# Patient Record
Sex: MALE | Race: WHITE | Employment: STUDENT | URBAN - METROPOLITAN AREA
[De-identification: names, ages, dates, MRNs, and addresses within clinical notes are randomized per-mention and may not be internally consistent; named-entity substitution may affect disease eponyms.]

---

## 2024-08-24 LAB — SICKLE CELL SCREEN: NEGATIVE

## 2024-11-06 ENCOUNTER — OFFICE VISIT (OUTPATIENT)
Dept: PRIMARY CARE CLINIC | Age: 19
End: 2024-11-06

## 2024-11-06 VITALS
RESPIRATION RATE: 16 BRPM | WEIGHT: 220 LBS | BODY MASS INDEX: 25.98 KG/M2 | OXYGEN SATURATION: 98 % | DIASTOLIC BLOOD PRESSURE: 66 MMHG | HEART RATE: 59 BPM | TEMPERATURE: 97.9 F | SYSTOLIC BLOOD PRESSURE: 100 MMHG | HEIGHT: 77 IN

## 2024-11-06 DIAGNOSIS — L02.416 ABSCESS OF LEFT LOWER LEG: Primary | ICD-10-CM

## 2024-11-06 RX ORDER — SULFAMETHOXAZOLE AND TRIMETHOPRIM 800; 160 MG/1; MG/1
1 TABLET ORAL 2 TIMES DAILY
Qty: 14 TABLET | Refills: 0 | Status: SHIPPED | OUTPATIENT
Start: 2024-11-06 | End: 2024-11-13

## 2024-11-06 SDOH — ECONOMIC STABILITY: FOOD INSECURITY: WITHIN THE PAST 12 MONTHS, YOU WORRIED THAT YOUR FOOD WOULD RUN OUT BEFORE YOU GOT MONEY TO BUY MORE.: NEVER TRUE

## 2024-11-06 SDOH — ECONOMIC STABILITY: FOOD INSECURITY: WITHIN THE PAST 12 MONTHS, THE FOOD YOU BOUGHT JUST DIDN'T LAST AND YOU DIDN'T HAVE MONEY TO GET MORE.: NEVER TRUE

## 2024-11-06 SDOH — ECONOMIC STABILITY: INCOME INSECURITY: HOW HARD IS IT FOR YOU TO PAY FOR THE VERY BASICS LIKE FOOD, HOUSING, MEDICAL CARE, AND HEATING?: NOT HARD AT ALL

## 2025-03-21 ENCOUNTER — OFFICE VISIT (OUTPATIENT)
Dept: PRIMARY CARE CLINIC | Age: 20
End: 2025-03-21

## 2025-03-21 VITALS
BODY MASS INDEX: 26.05 KG/M2 | OXYGEN SATURATION: 97 % | RESPIRATION RATE: 16 BRPM | TEMPERATURE: 98.1 F | HEIGHT: 77 IN | HEART RATE: 74 BPM | WEIGHT: 220.6 LBS | SYSTOLIC BLOOD PRESSURE: 113 MMHG | DIASTOLIC BLOOD PRESSURE: 65 MMHG

## 2025-03-21 DIAGNOSIS — T78.40XA ALLERGIC REACTION TO DRUG, INITIAL ENCOUNTER: Primary | ICD-10-CM

## 2025-03-21 DIAGNOSIS — Z23 NEED FOR TDAP VACCINATION: ICD-10-CM

## 2025-03-21 RX ORDER — CEPHALEXIN 500 MG/1
500 CAPSULE ORAL EVERY 6 HOURS
Qty: 20 CAPSULE | Refills: 0 | Status: SHIPPED | OUTPATIENT
Start: 2025-03-21 | End: 2025-03-26

## 2025-03-21 ASSESSMENT — ENCOUNTER SYMPTOMS
ABDOMINAL PAIN: 0
APNEA: 0
SORE THROAT: 0
VOMITING: 0

## 2025-03-21 NOTE — PROGRESS NOTES
Chief Complaint:   Allergic Reaction (Possible allergic reaction to abx Sulfametho-Xazole. Patient is taking abx for infected laceration on Rt cheek. He was seen at [facility]gent [facility] on 3/20/25. States after taking abx he started feeling a cold sensation through out body.)      History of Present Illness     History of Present Illness  The patient is a 19-year-old male who presents for evaluation of a potential allergic reaction.    He sought medical attention at an urgent care facility yesterday due to a facial infection resulting from a shaving cut sustained 3 to 4 days prior. He was prescribed an antibiotic, which he had not previously taken. Prior to the administration of the antibiotic, he was queried about his tetanus vaccination status, which was found to be incomplete. A few hours post-administration of the antibiotic, he experienced a tingling sensation in his neck. Upon awakening this morning, he reported a sensation similar to sciatica in both legs and arms. He has not developed any rash or hives. He reports no febrile episodes. He continues to apply a topical medication.    IMMUNIZATIONS  He is due for a tetanus vaccine.      Prior to Visit Medications    Not on File       Review of Systems   Review of Systems   Constitutional:  Positive for chills. Negative for activity change, appetite change and fever.   HENT:  Negative for congestion and sore throat.    Respiratory:  Negative for apnea.    Cardiovascular:  Negative for chest pain.   Gastrointestinal:  Negative for abdominal pain and vomiting.   Genitourinary:  Negative for dysuria.   Musculoskeletal:  Negative for myalgias.   Skin:  Negative for rash.   Neurological:  Negative for weakness and numbness.       Patient's medical, social, and family history reviewed    Past Medical History:  has no past medical history on file.   Past Surgical History:  has no past surgical history on file.  Social History:  reports that he has never smoked. He has never

## 2025-03-24 ENCOUNTER — OFFICE VISIT (OUTPATIENT)
Dept: PRIMARY CARE CLINIC | Age: 20
End: 2025-03-24

## 2025-03-24 VITALS
DIASTOLIC BLOOD PRESSURE: 75 MMHG | HEIGHT: 77 IN | HEART RATE: 57 BPM | WEIGHT: 220 LBS | RESPIRATION RATE: 16 BRPM | BODY MASS INDEX: 25.98 KG/M2 | SYSTOLIC BLOOD PRESSURE: 117 MMHG | TEMPERATURE: 97.5 F | OXYGEN SATURATION: 99 %

## 2025-03-24 DIAGNOSIS — M77.02 MEDIAL EPICONDYLITIS OF LEFT ELBOW: Primary | ICD-10-CM

## 2025-03-24 PROCEDURE — 99213 OFFICE O/P EST LOW 20 MIN: CPT | Performed by: NURSE PRACTITIONER

## 2025-03-24 PROCEDURE — 96372 THER/PROPH/DIAG INJ SC/IM: CPT | Performed by: NURSE PRACTITIONER

## 2025-03-24 RX ORDER — PREDNISONE 10 MG/1
TABLET ORAL
Qty: 18 TABLET | Refills: 0 | Status: SHIPPED | OUTPATIENT
Start: 2025-03-24 | End: 2025-04-02

## 2025-03-24 RX ORDER — METHYLPREDNISOLONE ACETATE 40 MG/ML
40 INJECTION, SUSPENSION INTRA-ARTICULAR; INTRALESIONAL; INTRAMUSCULAR; SOFT TISSUE ONCE
Status: COMPLETED | OUTPATIENT
Start: 2025-03-24 | End: 2025-03-24

## 2025-03-24 RX ADMIN — METHYLPREDNISOLONE ACETATE 40 MG: 40 INJECTION, SUSPENSION INTRA-ARTICULAR; INTRALESIONAL; INTRAMUSCULAR; SOFT TISSUE at 14:56

## 2025-03-24 NOTE — PROGRESS NOTES
Chief Complaint:   Generalized Body Aches (Previously seen at [facility] 3/21/25 for same symptoms, still taking abx. )    History of Present Illness   Chris Hardy  2005  [facility]     History of Present Illness  The patient is a 19-year-old male who presents for evaluation of body aches.    He reports experiencing intermittent ringing sensations in his fingers and elbow, which he attributes to nerve-related issues. These symptoms have been present for the past week. Approximately 2 years ago, a similar episode of ulnar nerve pain was diagnosed as an overuse injury and subsequently resolved. Recently, he had an infection and suspects it may have contributed to his current nerve issues. Additionally, there is a history of sciatic pain, primarily affecting his hamstrings and occasionally his elbow. He suspects a possible pinched nerve but reports no recent injuries, falls, or trauma.     He is right-handed but swings left-handed, and the pain is localized to his left hand. No neck pain is reported, although some tightness in the area is noted. He is currently on antibiotics for an infection.     Review of Systems   Unless otherwise stated in this report or unable to obtain because of the patient's clinical or mental status as evidenced by the medical record, this patients's positive and negative responses for Review of Systems, constitutional, psych, eyes, ENT, cardiovascular, respiratory, gastrointestinal, neurological, genitourinary, musculoskeletal, integument systems and systems related to the presenting problem are either stated in the preceding or were not pertinent or were negative for the symptoms and/or complaints related to the medical problem.    Past Medical History:  has no past medical history on file.   Past Surgical History:  has no past surgical history on file.  Social History:  reports that he has never smoked. He has never been exposed to tobacco smoke. He has never used